# Patient Record
Sex: MALE
[De-identification: names, ages, dates, MRNs, and addresses within clinical notes are randomized per-mention and may not be internally consistent; named-entity substitution may affect disease eponyms.]

---

## 2019-07-13 ENCOUNTER — TRANSCRIPTION ENCOUNTER (OUTPATIENT)
Age: 29
End: 2019-07-13

## 2023-09-27 ENCOUNTER — APPOINTMENT (OUTPATIENT)
Dept: INTERNAL MEDICINE | Facility: CLINIC | Age: 33
End: 2023-09-27

## 2024-02-27 ENCOUNTER — EMERGENCY (EMERGENCY)
Facility: HOSPITAL | Age: 34
LOS: 1 days | Discharge: HOME | End: 2024-02-27
Attending: STUDENT IN AN ORGANIZED HEALTH CARE EDUCATION/TRAINING PROGRAM
Payer: COMMERCIAL

## 2024-02-27 VITALS
DIASTOLIC BLOOD PRESSURE: 95 MMHG | OXYGEN SATURATION: 99 % | HEIGHT: 72 IN | WEIGHT: 225.09 LBS | SYSTOLIC BLOOD PRESSURE: 135 MMHG | TEMPERATURE: 98 F | HEART RATE: 93 BPM | RESPIRATION RATE: 18 BRPM

## 2024-02-27 VITALS
HEART RATE: 78 BPM | OXYGEN SATURATION: 100 % | SYSTOLIC BLOOD PRESSURE: 121 MMHG | DIASTOLIC BLOOD PRESSURE: 86 MMHG | RESPIRATION RATE: 17 BRPM

## 2024-02-27 PROCEDURE — 99282 EMERGENCY DEPT VISIT SF MDM: CPT

## 2024-02-27 PROCEDURE — 30901 CONTROL OF NOSEBLEED: CPT | Mod: LT

## 2024-02-27 PROCEDURE — 99283 EMERGENCY DEPT VISIT LOW MDM: CPT | Mod: 25

## 2024-02-27 RX ORDER — TRANEXAMIC ACID 100 MG/ML
5 INJECTION, SOLUTION INTRAVENOUS ONCE
Refills: 0 | Status: DISCONTINUED | OUTPATIENT
Start: 2024-02-27 | End: 2024-03-01

## 2024-02-27 NOTE — ED PROVIDER NOTE - OBJECTIVE STATEMENT
Patient is a 33y M PMHX HTN pw epistaxis. Patient states his nose has been bleeding all night. not on a/c. No hx of epistaxis lasting this long. Has tried multiple round of holding pressure at home without success. Has been coughing up clots. Bleeding only coming from left nare. Patient denies CP, SOB, dizziness, syncope.

## 2024-02-27 NOTE — ED PROVIDER NOTE - ATTENDING CONTRIBUTION TO CARE
I, Dr. Julianna Damon, have personally performed a face to face medical and diagnostic evaluation of the patient. I have discussed with and reviewed the Resident's and/or ACP's and/or Medical/PA/NP student's note and agree with the History, ROS, Physical Exam and MDM unless otherwise indicated. A brief summary of my personal evaluation and impression can be found below.    MDM: Patient is a 33-year-old male, otherwise healthy, not on any medications presenting with epistaxis since 10:30 PM last night.  Attempted direct pressure, cold compresses and packing without relief.  Otherwise feels well, denies trauma to the area, no history of previous epistaxis episodes.  Patient is very well-appearing otherwise, breathing comfortably, hemodynamically stable.  Left nare with packing in place, hemostatic, oropharynx with small amount of blood.  Plan for packing with Afrin/TXA and reassess.  If unable to achieve hemostasis will escalate as needed.

## 2024-02-27 NOTE — ED PROVIDER NOTE - PATIENT PORTAL LINK FT
You can access the FollowMyHealth Patient Portal offered by [facility] by registering at the following website: [email or web address]. By joining [facility]’s FollowMyHealth portal, you will also be able to view your health information using other applications (apps) compatible with our system.

## 2024-02-27 NOTE — ED PROVIDER NOTE - NSFOLLOWUPCLINICS_GEN_ALL_ED_FT
[facility] - ENT  Otolaryngology (ENT)  [address] [place] [address]  Phone: [phone]  Fax:

## 2024-02-27 NOTE — ED ADULT TRIAGE NOTE - CHIEF COMPLAINT QUOTE
spontaneous nose bleed that started around 2200 last night. Denies trauma/injury/fall, headache, dizziness, weakness. Hx HTN compliant with meds

## 2024-02-27 NOTE — ED ADULT NURSE NOTE - OBJECTIVE STATEMENT
33 year old male, A&OX4, no known PMH, presenting to ED complaining of epistaxis. Patient states nose bleed started yesterday evening at 8 pm and has not stopped since. Has had history of nose bleeds in the past but never this severe and always resolving on their own. Patient denies head and face trauma. Denies headaches, dizziness, syncope, changes in vision, CP, SOB, HA, n/v/d, abdominal pain, difficulty urinating. Patient placed on cardiac monitor, NSR 80s. Respirations clear and equal bilaterally. Abdomen soft, nontender and nondistended. Peripheral pulses strong and equal bilaterally. Safety and comfort measures maintained.

## 2024-02-27 NOTE — ED PROVIDER NOTE - PROGRESS NOTE DETAILS
Tiffany Bailon MD PGY3: Afrin spray applied, TXA with gauze applied, patient holding pressure. Reassess at 7:30 AM. May require packing. Patient with active bleeding during application of meds. Patient will need ENT f/u. Hemodynamically stable at this time. do not suspect posterior bleed. Grisel CHRISTINE PGY3: Reassessed patient after interventions.  No active bleeding at this time.  Dried blood in left nare.  No bleeding in throat.  Patient remains hemodynamically stable.  Safe for discharge with ENT follow-up and strict return precautions given.  Patient in agreement with plan.

## 2024-02-27 NOTE — ED PROVIDER NOTE - PHYSICAL EXAMINATION
GENERAL: no acute distress, non-toxic appearing  HEAD: normocephalic, atraumatic  HEENT: PERRLA, EOMI, normal conjunctiva, +bleeding from left nare  CARDIAC: regular rate and rhythm  PULM: clear to ascultation bilaterally  GI: abdomen nondistended, soft, nontender  NEURO: alert and oriented x 3, normal speech, no gross neurologic deficit  MSK: no visible deformities  SKIN: no visible rashes, dry, well-perfused  PSYCH: appropriate mood and affect